# Patient Record
Sex: MALE | URBAN - METROPOLITAN AREA
[De-identification: names, ages, dates, MRNs, and addresses within clinical notes are randomized per-mention and may not be internally consistent; named-entity substitution may affect disease eponyms.]

---

## 2024-11-20 ENCOUNTER — OFFICE VISIT (OUTPATIENT)
Dept: URBAN - METROPOLITAN AREA CLINIC 115 | Facility: CLINIC | Age: 49
End: 2024-11-20
Payer: COMMERCIAL

## 2024-11-20 ENCOUNTER — DASHBOARD ENCOUNTERS (OUTPATIENT)
Age: 49
End: 2024-11-20

## 2024-11-20 VITALS
HEIGHT: 74 IN | BODY MASS INDEX: 35.37 KG/M2 | HEART RATE: 80 BPM | SYSTOLIC BLOOD PRESSURE: 144 MMHG | DIASTOLIC BLOOD PRESSURE: 89 MMHG | WEIGHT: 275.6 LBS | TEMPERATURE: 97.9 F

## 2024-11-20 DIAGNOSIS — T40.2X5A ADVERSE EFFECT OF OTHER OPIOIDS, INITIAL ENCOUNTER: ICD-10-CM

## 2024-11-20 DIAGNOSIS — R14.2 BELCHING: ICD-10-CM

## 2024-11-20 DIAGNOSIS — K59.03 DRUG INDUCED CONSTIPATION: ICD-10-CM

## 2024-11-20 DIAGNOSIS — R14.3 EXCESSIVE GAS: ICD-10-CM

## 2024-11-20 PROCEDURE — 99203 OFFICE O/P NEW LOW 30 MIN: CPT

## 2024-11-20 RX ORDER — OMEPRAZOLE 40 MG/1
1 CAPSULE 30 MINUTES BEFORE MORNING MEAL CAPSULE, DELAYED RELEASE ORAL ONCE A DAY
Status: ACTIVE | COMMUNITY

## 2024-11-20 RX ORDER — BUPROPION HYDROCHLORIDE 150 MG/1
TAKE 1 TABLET BY MOUTH EVERY DAY IN THE MORNING TABLET, FILM COATED, EXTENDED RELEASE ORAL
Qty: 30 EACH | Refills: 0 | Status: ACTIVE | COMMUNITY

## 2024-11-20 RX ORDER — PHENTERMINE HYDROCHLORIDE 37.5 MG/1
TABLET ORAL
Qty: 30 TABLET | Status: ACTIVE | COMMUNITY

## 2024-11-20 RX ORDER — PROPRANOLOL HYDROCHLORIDE 80 MG/1
1 TABLET TABLET ORAL TWICE A DAY
Status: ACTIVE | COMMUNITY

## 2024-11-20 RX ORDER — INDOMETHACIN 25 MG/1
CAPSULE ORAL
Qty: 60 CAPSULE | Status: ACTIVE | COMMUNITY

## 2024-11-20 RX ORDER — VALSARTAN 80 MG/1
1 TABLET TABLET, FILM COATED ORAL ONCE A DAY
Status: ACTIVE | COMMUNITY

## 2024-11-20 RX ORDER — POLYETHYLENE GLYCOL 3350 17 G/17G
1 SCOOP MIXED WITH 8 OUNCES OF FLUID POWDER, FOR SOLUTION ORAL ONCE A DAY
Qty: 238 GRAM | Refills: 3 | OUTPATIENT
Start: 2024-11-20 | End: 2025-03-20

## 2024-11-20 RX ORDER — VENLAFAXINE HYDROCHLORIDE 37.5 MG/1
CAPSULE, EXTENDED RELEASE ORAL
Qty: 90 CAPSULE | Status: ACTIVE | COMMUNITY

## 2024-11-20 RX ORDER — HYDROCODONE BITARTRATE AND ACETAMINOPHEN 7.5; 325 MG/1; MG/1
TABLET ORAL
Qty: 120 TABLET | Status: ACTIVE | COMMUNITY

## 2024-11-20 RX ORDER — VENLAFAXINE HYDROCHLORIDE 25 MG/1
TAKE 1 TABLET BY MOUTH EVERY DAY FOR 3 DAYS, THEN 1/2 TAB FOR 3 DAYS, THEN DISCONTINUE TABLET ORAL
Qty: 5 EACH | Refills: 0 | Status: ACTIVE | COMMUNITY

## 2024-11-20 NOTE — HPI-TODAY'S VISIT:
50 y/o M with PMH of HTN, DM, CHANTEL (pending [employer]) presents with c/o gas, burping intermittently for mos. Reports sometimes he has trapped gas and abdominal discomfort. Sometimes has epigastric burning despite omeprazole 40mg qd. Notes that sometimes when he belches some food does regurgitate 1-2x a week. Sx are worst in the AM. BMs are daily, incomplete evacuation, type 1. Tried eating more fruits, stool softener which only helps a little. Notes when he eats dairy, he has 2-3 days of abdominal pain. Reports 45lb weight gain as well; supposed to start weight loss meds. Has tried GasX. Denies diarrhea, blood in stool, dysphagia, odynophagia, change in appetite, early satiety. Takes indomethacin prn. Former EtOH/tobacco. Denies marijuana use.  Last colonoscopy: unknown when, normal per pt, no polyps. On hydrocodone for foot pain.

## 2024-12-03 ENCOUNTER — TELEPHONE ENCOUNTER (OUTPATIENT)
Dept: URBAN - METROPOLITAN AREA CLINIC 115 | Facility: CLINIC | Age: 49
End: 2024-12-03

## 2024-12-03 RX ORDER — PANTOPRAZOLE SODIUM 40 MG/1
1 TABLET 1/2 TO 1 HOUR BEFORE MORNING MEAL TABLET, DELAYED RELEASE ORAL ONCE A DAY
Qty: 30 | Refills: 1 | OUTPATIENT
Start: 2024-12-03

## 2025-01-22 ENCOUNTER — OFFICE VISIT (OUTPATIENT)
Dept: URBAN - METROPOLITAN AREA TELEHEALTH 2 | Facility: TELEHEALTH | Age: 50
End: 2025-01-22

## 2025-01-22 ENCOUNTER — OFFICE VISIT (OUTPATIENT)
Dept: URBAN - METROPOLITAN AREA CLINIC 115 | Facility: CLINIC | Age: 50
End: 2025-01-22

## 2025-01-22 RX ORDER — PANTOPRAZOLE SODIUM 40 MG/1
1 TABLET 1/2 TO 1 HOUR BEFORE MORNING MEAL TABLET, DELAYED RELEASE ORAL ONCE A DAY
Qty: 30 | Refills: 1 | Status: ACTIVE | COMMUNITY
Start: 2024-12-03

## 2025-01-22 RX ORDER — PROPRANOLOL HYDROCHLORIDE 80 MG/1
1 TABLET TABLET ORAL TWICE A DAY
Status: ACTIVE | COMMUNITY

## 2025-01-22 RX ORDER — OMEPRAZOLE 40 MG/1
1 CAPSULE 30 MINUTES BEFORE MORNING MEAL CAPSULE, DELAYED RELEASE ORAL ONCE A DAY
Status: ACTIVE | COMMUNITY

## 2025-01-22 RX ORDER — HYDROCODONE BITARTRATE AND ACETAMINOPHEN 7.5; 325 MG/1; MG/1
TABLET ORAL
Qty: 120 TABLET | Status: ACTIVE | COMMUNITY

## 2025-01-22 RX ORDER — BUPROPION HYDROCHLORIDE 150 MG/1
TAKE 1 TABLET BY MOUTH EVERY DAY IN THE MORNING TABLET, FILM COATED, EXTENDED RELEASE ORAL
Qty: 30 EACH | Refills: 0 | Status: ACTIVE | COMMUNITY

## 2025-01-22 RX ORDER — VALSARTAN 80 MG/1
1 TABLET TABLET, FILM COATED ORAL ONCE A DAY
Status: ACTIVE | COMMUNITY

## 2025-01-22 RX ORDER — PHENTERMINE HYDROCHLORIDE 37.5 MG/1
TABLET ORAL
Qty: 30 TABLET | Status: ACTIVE | COMMUNITY

## 2025-01-22 RX ORDER — INDOMETHACIN 25 MG/1
CAPSULE ORAL
Qty: 60 CAPSULE | Status: ACTIVE | COMMUNITY

## 2025-01-22 RX ORDER — VENLAFAXINE HYDROCHLORIDE 37.5 MG/1
CAPSULE, EXTENDED RELEASE ORAL
Qty: 90 CAPSULE | Status: ACTIVE | COMMUNITY

## 2025-01-22 RX ORDER — POLYETHYLENE GLYCOL 3350 17 G/DOSE
1 SCOOP MIXED WITH 8 OUNCES OF FLUID POWDER (GRAM) ORAL ONCE A DAY
Qty: 238 GRAM | Refills: 3 | Status: ACTIVE | COMMUNITY
Start: 2024-11-20 | End: 2025-03-20

## 2025-01-22 RX ORDER — VENLAFAXINE HYDROCHLORIDE 25 MG/1
TAKE 1 TABLET BY MOUTH EVERY DAY FOR 3 DAYS, THEN 1/2 TAB FOR 3 DAYS, THEN DISCONTINUE TABLET ORAL
Qty: 5 EACH | Refills: 0 | Status: ACTIVE | COMMUNITY

## 2025-01-22 NOTE — HPI-TODAY'S VISIT:
50 y/o M with PMH of HTN, DM, CHANTEL (pending [employer]SP[employer]) presents with c/o gas, burping intermittently for mos. Reports sometimes he has trapped gas and abdominal discomfort. Sometimes has epigastric burning despite omeprazole 40mg qd. Notes that sometimes when he belches some food does regurgitate 1-2x a week. Sx are worst in the AM. BMs are daily, incomplete evacuation, type 1. Tried eating more fruits, stool softener which only helps a little. Notes when he eats dairy, he has 2-3 days of abdominal pain. Reports 45lb weight gain as well; supposed to start weight loss meds. Has tried GasX. Takes indomethacin prn. Former EtOH/tobacco. Denies marijuana use.  Last colonoscopy: unknown when, normal per pt, no polyps. On hydrocodone for foot pain.  Started pantoprazole 40mg after neg HBT for his belching, epigastric burning. Regurgitation is . Started miralax 1-2 caps daily and high fiber, BMs are . Trapped gas is . Abd pain is .

## 2025-01-24 ENCOUNTER — TELEPHONE ENCOUNTER (OUTPATIENT)
Dept: URBAN - METROPOLITAN AREA CLINIC 115 | Facility: CLINIC | Age: 50
End: 2025-01-24